# Patient Record
Sex: MALE | Race: WHITE | Employment: FULL TIME | URBAN - METROPOLITAN AREA
[De-identification: names, ages, dates, MRNs, and addresses within clinical notes are randomized per-mention and may not be internally consistent; named-entity substitution may affect disease eponyms.]

---

## 2019-06-24 ENCOUNTER — HOSPITAL ENCOUNTER (EMERGENCY)
Age: 24
Discharge: HOME | End: 2019-06-24
Attending: EMERGENCY MEDICINE
Payer: COMMERCIAL

## 2019-06-24 VITALS
RESPIRATION RATE: 16 BRPM | BODY MASS INDEX: 23.19 KG/M2 | TEMPERATURE: 98.3 F | DIASTOLIC BLOOD PRESSURE: 70 MMHG | SYSTOLIC BLOOD PRESSURE: 130 MMHG | HEIGHT: 73 IN | OXYGEN SATURATION: 99 % | WEIGHT: 175 LBS | HEART RATE: 72 BPM

## 2019-06-24 DIAGNOSIS — S29.012A STRAIN OF RHOMBOID MUSCLE, INITIAL ENCOUNTER: Primary | ICD-10-CM

## 2019-06-24 DIAGNOSIS — M62.838 SPASM OF MUSCLE: ICD-10-CM

## 2019-06-24 PROCEDURE — 99282 EMERGENCY DEPT VISIT SF MDM: CPT

## 2019-06-24 PROCEDURE — 6360000002 HC RX W HCPCS: Performed by: EMERGENCY MEDICINE

## 2019-06-24 PROCEDURE — 6370000000 HC RX 637 (ALT 250 FOR IP): Performed by: EMERGENCY MEDICINE

## 2019-06-24 PROCEDURE — 96372 THER/PROPH/DIAG INJ SC/IM: CPT

## 2019-06-24 RX ORDER — KETOROLAC TROMETHAMINE 30 MG/ML
30 INJECTION, SOLUTION INTRAMUSCULAR; INTRAVENOUS ONCE
Status: COMPLETED | OUTPATIENT
Start: 2019-06-24 | End: 2019-06-24

## 2019-06-24 RX ORDER — NAPROXEN 500 MG/1
500 TABLET ORAL 2 TIMES DAILY
Qty: 14 TABLET | Refills: 0 | Status: SHIPPED | OUTPATIENT
Start: 2019-06-24 | End: 2019-07-09 | Stop reason: ALTCHOICE

## 2019-06-24 RX ORDER — CYCLOBENZAPRINE HCL 10 MG
10 TABLET ORAL 3 TIMES DAILY PRN
Qty: 12 TABLET | Refills: 0 | Status: SHIPPED | OUTPATIENT
Start: 2019-06-24 | End: 2019-06-28

## 2019-06-24 RX ORDER — CYCLOBENZAPRINE HCL 10 MG
10 TABLET ORAL ONCE
Status: COMPLETED | OUTPATIENT
Start: 2019-06-24 | End: 2019-06-24

## 2019-06-24 RX ADMIN — CYCLOBENZAPRINE HYDROCHLORIDE 10 MG: 10 TABLET, FILM COATED ORAL at 01:14

## 2019-06-24 RX ADMIN — KETOROLAC TROMETHAMINE 30 MG: 30 INJECTION, SOLUTION INTRAMUSCULAR at 01:14

## 2019-06-24 ASSESSMENT — PAIN SCALES - GENERAL
PAINLEVEL_OUTOF10: 2
PAINLEVEL_OUTOF10: 2

## 2019-06-24 ASSESSMENT — PAIN DESCRIPTION - LOCATION: LOCATION: BACK

## 2019-06-24 ASSESSMENT — PAIN DESCRIPTION - ORIENTATION: ORIENTATION: UPPER

## 2019-06-24 NOTE — ED PROVIDER NOTES
HPI:  6/24/19,   Time: 1:05 AM         Rawland Goldmann is a 20 y.o. male presenting to the ED for RT pos tier shoulder pain , beginning several hours ago. The complaint has been persistent, mild in severity, and worsened by movement of right arm and shoulder, also has pain with movement of the left shoulder. Patient states that he was working at [employer] when he was taking wood off a pallet and turning and twisting to his machine. States he caught his foot on a piece of metal and wrenched his back. He does have pain medial to the medial border of the right scapula. Worse with range of motion of the shoulder especially adduction and internal rotation of the right shoulder. Patient also complains of pain with movement of the left shoulder. He has no neurological deficits. He has no midline cervical or thoracic tenderness. He denies chest pain cough or shortness of breath. No hemoptysis. .      ROS:   Pertinent positives and negatives are stated within HPI, all other systems reviewed and are negative.  --------------------------------------------- PAST HISTORY ---------------------------------------------  Past Medical History:  has a past medical history of Seasonal allergies. Past Surgical History:  has a past surgical history that includes Tonsillectomy and adenoidectomy. Social History:  reports that he has been smoking. He started smoking about 6 years ago. He has a 3.00 pack-year smoking history. He has never used smokeless tobacco. He reports that he drinks alcohol. He reports that he does not use drugs. Family History: family history is not on file. The patients home medications have been reviewed. Allergies: Patient has no known allergies.     -------------------------------------------------- RESULTS -------------------------------------------------  All laboratory and radiology results have been personally reviewed by myself   LABS:  No results found for this visit on 06/24/19. RADIOLOGY:  Interpreted by Radiologist.  No orders to display       ------------------------- NURSING NOTES AND VITALS REVIEWED ---------------------------   The nursing notes within the ED encounter and vital signs as below have been reviewed. /70   Pulse 72   Temp 98.3 °F (36.8 °C) (Oral)   Resp 16   Ht 6' 1\" (1.854 m)   Wt 175 lb (79.4 kg)   SpO2 99%   BMI 23.09 kg/m²   Oxygen Saturation Interpretation: Normal      ---------------------------------------------------PHYSICAL EXAM--------------------------------------      Constitutional/General: Alert and oriented x3, well appearing, non toxic in NAD  Head: NC/AT  Eyes: PERRL, EOMI  Mouth: Oropharynx clear, handling secretions, no trismus  Neck: Supple, full ROM, no meningeal signs  Pulmonary: Lungs clear to auscultation bilaterally, no wheezes, rales, or rhonchi. Not in respiratory distress, Tenderness to the medical aspect of the RT scapula. NO CTL Midline tenderness or step offs. Cardiovascular:  Regular rate and rhythm, no murmurs, gallops, or rubs. 2+ distal pulses  Abdomen: Soft, non tender, non distended,   Extremities: Moves all extremities x 4. Warm and well perfused  Skin: warm and dry without rash  Neurologic: GCS 15,  Psych: Normal Affect      ------------------------------ ED COURSE/MEDICAL DECISION MAKING----------------------  Medications   ketorolac (TORADOL) injection 30 mg (30 mg Intramuscular Given 6/24/19 0114)   cyclobenzaprine (FLEXERIL) tablet 10 mg (10 mg Oral Given 6/24/19 0114)         Medical Decision Making:    No imaging is required. Patient does have tenderness palpation with muscle spasm noted to the rhomboids bilaterally. More so right than left. He was given Toradol and Flexeril. He'll be discharged home with work restrictions. He is to follow-up with his PCP or Occupational medicine. Re-examination:  6/24/19     Time: 0134  Patients symptoms are improving.   Repeat physical examination has slightly improved. Full ROM RT arm . Counseling: The emergency provider has spoken with the patient and discussed todays results, in addition to providing specific details for the plan of care and counseling regarding the diagnosis and prognosis. Questions are answered at this time and they are agreeable with the plan.      --------------------------------- IMPRESSION AND DISPOSITION ---------------------------------    IMPRESSION  1. Strain of rhomboid muscle, initial encounter    2.  Spasm of muscle        DISPOSITION  Disposition: Discharge to home  Patient condition is stable                Joanne Tirado DO  06/24/19 0141

## 2019-06-24 NOTE — LETTER
[address] [facility] Emergency Department  [facility] [address]  Phone: [phone]               June 24, 2019    Patient: Harriet Ansari   YOB: 1995   Date of Visit: 6/24/2019       To Whom It May Concern:    Harriet Ansari was seen and treated in our emergency department on 6/24/2019. He may return to work on 6/25/2019.       Sincerely,       Uday Flynn DO         Signature:__________________________________

## 2019-06-24 NOTE — ED NOTES
Work note given   Discharged at this time. No change in pain-  Ok with current plan.    rx x 2 given     Sammie Timmons RN  06/24/19 7276

## 2019-07-09 ENCOUNTER — HOSPITAL ENCOUNTER (EMERGENCY)
Age: 24
Discharge: HOME | End: 2019-07-09
Attending: EMERGENCY MEDICINE
Payer: COMMERCIAL

## 2019-07-09 VITALS
SYSTOLIC BLOOD PRESSURE: 120 MMHG | OXYGEN SATURATION: 97 % | RESPIRATION RATE: 16 BRPM | TEMPERATURE: 97.6 F | HEIGHT: 73 IN | HEART RATE: 95 BPM | BODY MASS INDEX: 23.19 KG/M2 | WEIGHT: 175 LBS | DIASTOLIC BLOOD PRESSURE: 88 MMHG

## 2019-07-09 DIAGNOSIS — S29.019D THORACIC MYOFASCIAL STRAIN, SUBSEQUENT ENCOUNTER: Primary | ICD-10-CM

## 2019-07-09 PROCEDURE — 6360000002 HC RX W HCPCS: Performed by: EMERGENCY MEDICINE

## 2019-07-09 PROCEDURE — 99282 EMERGENCY DEPT VISIT SF MDM: CPT

## 2019-07-09 PROCEDURE — 96372 THER/PROPH/DIAG INJ SC/IM: CPT

## 2019-07-09 RX ORDER — KETOROLAC TROMETHAMINE 30 MG/ML
60 INJECTION, SOLUTION INTRAMUSCULAR; INTRAVENOUS ONCE
Status: COMPLETED | OUTPATIENT
Start: 2019-07-09 | End: 2019-07-09

## 2019-07-09 RX ORDER — CYCLOBENZAPRINE HCL 5 MG
5 TABLET ORAL 3 TIMES DAILY PRN
Qty: 30 TABLET | Refills: 0 | Status: SHIPPED | OUTPATIENT
Start: 2019-07-09 | End: 2019-07-19

## 2019-07-09 RX ADMIN — KETOROLAC TROMETHAMINE 60 MG: 30 INJECTION, SOLUTION INTRAMUSCULAR at 03:06

## 2019-07-09 ASSESSMENT — PAIN DESCRIPTION - LOCATION: LOCATION: BACK

## 2019-07-09 ASSESSMENT — PAIN DESCRIPTION - ORIENTATION: ORIENTATION: UPPER

## 2019-07-09 ASSESSMENT — PAIN DESCRIPTION - PAIN TYPE: TYPE: ACUTE PAIN

## 2019-07-09 ASSESSMENT — PAIN SCALES - GENERAL
PAINLEVEL_OUTOF10: 5
PAINLEVEL_OUTOF10: 5

## 2019-07-09 NOTE — ED PROVIDER NOTES
Saturation Interpretation: Normal.    Constitutional:  Alert, development consistent with age. HEENT:  NC/NT. Airway patent. Neck:  Normal ROM. Supple. Respiratory:  Clear to auscultation and breath sounds equal.  CV:  Regular rate and rhythm, normal heart sounds, without pathological murmurs, ectopy, gallops, or rubs. GI:  Abdomen Soft, nontender, good bowel sounds. No firm or pulsatile mass. Back: upper thoracic spine bilateral.             Tenderness: Moderate. Swelling: no.              Range of Motion: full range of motion. CVA Tenderness: No.                     Skin:  no erythema, rash or swelling noted. Distal Function:              Motor deficit: none. Sensory deficit: none. Pulse deficit: none. Edema:  None all extremity(s). Gait:  normal.  Integument:  Normal turgor. Warm, dry, without visible rash. Lymphatics: No lymphangitis or adenopathy noted. Neurological:  Oriented. Motor functions intact. Lab / Imaging Results   (All laboratory and radiology results have been personally reviewed by myself)  Labs:  No results found for this visit on 07/09/19. Imaging: All Radiology results interpreted by Radiologist unless otherwise noted. No orders to display       ED Course / Medical Decision Making     Medications   ketorolac (TORADOL) injection 60 mg (60 mg Intramuscular Given 7/9/19 0306)        Re-examination:  7/9/19       Time: 03:30    Patients symptoms are improving. Medical Decision Making:    Films were not obtained based on negative suspicion for bony injury as per history/physical findings . [facility] At this time the patient is without objective evidence of an acute process requiring inpatient management. Counseling:     The emergency provider has spoken with the patient and discussed todays visit, in addition to providing specific details for the plan of care and counseling regarding the diagnosis

## 2019-07-09 NOTE — ED NOTES
Patient discharged to waiting room, lab to do post injury drug screen.       Heather Shoemaker RN  07/09/19 0734

## 2019-07-27 ENCOUNTER — HOSPITAL ENCOUNTER (OUTPATIENT)
Age: 24
Discharge: HOME | End: 2019-07-29
Payer: COMMERCIAL

## 2019-07-27 ENCOUNTER — HOSPITAL ENCOUNTER (OUTPATIENT)
Dept: GENERAL RADIOLOGY | Age: 24
Discharge: HOME | End: 2019-07-29
Payer: COMMERCIAL

## 2019-07-27 DIAGNOSIS — S46.812A STRAIN OF LEFT TRAPEZIUS MUSCLE, INITIAL ENCOUNTER: ICD-10-CM

## 2019-07-27 DIAGNOSIS — S29.012A UPPER BACK STRAIN, INITIAL ENCOUNTER: ICD-10-CM

## 2019-07-27 PROCEDURE — 72050 X-RAY EXAM NECK SPINE 4/5VWS: CPT

## 2019-07-27 PROCEDURE — 72072 X-RAY EXAM THORAC SPINE 3VWS: CPT

## 2019-07-31 ENCOUNTER — EVALUATION (OUTPATIENT)
Dept: PHYSICAL THERAPY | Age: 24
End: 2019-07-31
Payer: COMMERCIAL

## 2019-07-31 DIAGNOSIS — S23.3XXA SPRAIN OF LIGAMENTS OF THORACIC SPINE, INITIAL ENCOUNTER: Primary | ICD-10-CM

## 2019-07-31 PROCEDURE — 97163 PT EVAL HIGH COMPLEX 45 MIN: CPT | Performed by: PHYSICAL THERAPIST

## 2019-07-31 RX ORDER — CYCLOBENZAPRINE HCL 10 MG
10 TABLET ORAL 3 TIMES DAILY PRN
COMMUNITY

## 2019-07-31 RX ORDER — NAPROXEN 500 MG/1
500 TABLET ORAL 2 TIMES DAILY WITH MEALS
COMMUNITY

## 2019-08-02 ENCOUNTER — TREATMENT (OUTPATIENT)
Dept: PHYSICAL THERAPY | Age: 24
End: 2019-08-02
Payer: COMMERCIAL

## 2019-08-02 DIAGNOSIS — S23.3XXA SPRAIN OF LIGAMENTS OF THORACIC SPINE, INITIAL ENCOUNTER: Primary | ICD-10-CM

## 2019-08-02 PROCEDURE — 97530 THERAPEUTIC ACTIVITIES: CPT

## 2019-08-02 PROCEDURE — 97110 THERAPEUTIC EXERCISES: CPT

## 2019-08-05 ENCOUNTER — TREATMENT (OUTPATIENT)
Dept: PHYSICAL THERAPY | Age: 24
End: 2019-08-05
Payer: COMMERCIAL

## 2019-08-05 DIAGNOSIS — S23.3XXA SPRAIN OF LIGAMENTS OF THORACIC SPINE, INITIAL ENCOUNTER: Primary | ICD-10-CM

## 2019-08-05 PROCEDURE — 97110 THERAPEUTIC EXERCISES: CPT | Performed by: PHYSICAL THERAPIST

## 2019-08-05 PROCEDURE — 97530 THERAPEUTIC ACTIVITIES: CPT | Performed by: PHYSICAL THERAPIST

## 2019-08-07 ENCOUNTER — TREATMENT (OUTPATIENT)
Dept: PHYSICAL THERAPY | Age: 24
End: 2019-08-07
Payer: COMMERCIAL

## 2019-08-07 DIAGNOSIS — S23.3XXA SPRAIN OF LIGAMENTS OF THORACIC SPINE, INITIAL ENCOUNTER: Primary | ICD-10-CM

## 2019-08-07 PROCEDURE — 97530 THERAPEUTIC ACTIVITIES: CPT

## 2019-08-07 PROCEDURE — 97110 THERAPEUTIC EXERCISES: CPT

## 2019-08-09 ENCOUNTER — TREATMENT (OUTPATIENT)
Dept: PHYSICAL THERAPY | Age: 24
End: 2019-08-09
Payer: COMMERCIAL

## 2019-08-09 DIAGNOSIS — S23.3XXA SPRAIN OF LIGAMENTS OF THORACIC SPINE, INITIAL ENCOUNTER: Primary | ICD-10-CM

## 2019-08-09 PROCEDURE — 97110 THERAPEUTIC EXERCISES: CPT | Performed by: PHYSICAL THERAPIST

## 2019-08-09 PROCEDURE — 97530 THERAPEUTIC ACTIVITIES: CPT | Performed by: PHYSICAL THERAPIST

## 2019-08-09 NOTE — PROGRESS NOTES
Physical Therapy Daily Treatment Note    Date: 2019  Patient Name: Carolyn Giraldo  : 1995   MRN: [id number]  DOInjury: initial 19, then reinjured 19    Referring Provider: Dipti Caro [address] [facility] [address]  [place], [place] [address]                             Medical Diagnosis:   S23. 3XXA (ICD-10-CM) - Sprain of ligaments of thoracic spine, initial encounter  Outcome Measure:  NDI 22%    12 visits WC    S:pt reports  Increase soreness today,  . O:  Time 9293-1855  am     Visit  Repeat outcome measure at mid point and end. Pain 2/10     ROM      Modalities      MH X 15 mins supine   pre ex's  MO   Ice            Manual                  Stretch                              Exercise      UBE  Fwds/bkds X 3/3   TE         Nustep        ROWS: H Duque  2 x 20  TA   ROWS: M      55#  3 x 15  seated   TA   ROWS: L Duque  2 x 20  TA   Pushing  Gray 2 x 20  TA   Shoulder ER      Cross country ski [place]han 2 x 20   TA   Shrugs      Shoulder Press Emily 5# 2 x 20  TA               Cervical isometrics deep flexors      Cervical isometrics deep extensors            A:  Tolerated well. Above added to written HEP. P: Continue with rehab plan. Pt scheduled for 1 rx next wk to call back for future rx sessions not sure of scheduled Dr's appt time.       WC 12 visits total .  Kev Young PTA    Treatment Charges: Mins Units   Initial Evaluation     Re-Evaluation     Ther Exercise         TE 10 1   Manual Therapy     MT     Ther Activities        TA 25 2   Gait Training          GT     Neuro Re-education NR     Modalities     Non-Billable Service Time MH  X 15 min  0   Other     Total Time/Units 50 3

## 2019-08-13 ENCOUNTER — TREATMENT (OUTPATIENT)
Dept: PHYSICAL THERAPY | Age: 24
End: 2019-08-13
Payer: COMMERCIAL

## 2019-08-13 DIAGNOSIS — S23.3XXA SPRAIN OF LIGAMENTS OF THORACIC SPINE, INITIAL ENCOUNTER: Primary | ICD-10-CM

## 2019-08-13 PROCEDURE — G0283 ELEC STIM OTHER THAN WOUND: HCPCS

## 2019-08-13 PROCEDURE — 97110 THERAPEUTIC EXERCISES: CPT

## 2019-08-13 PROCEDURE — 97530 THERAPEUTIC ACTIVITIES: CPT

## 2019-08-13 NOTE — PROGRESS NOTES
Physical Therapy Daily Treatment Note    Date: 2019  Patient Name: Heidi Rojas  : 1995   MRN: [id number]  DOInjury: initial 19, then reinjured 19    Referring Provider: Dipti Thurston [address] [facility] [address]  [place], [place] [address]                             Medical Diagnosis:   S23. 3XXA (ICD-10-CM) - Sprain of ligaments of thoracic spine, initial encounter  Outcome Measure:  NDI 22%                 Midpoint NDI score= 22% ( same) . 12 visits WC    S:pt reports no new significant changes, along with having trouble sleeping last night  . O:  Time 0795-2338   am     Visit  Repeat outcome measure at mid point and end. Pain 3-4 /10     ROM      Modalities      MH   + ES X 15 mins supine   pre ex's  MO   Ice            Manual                  Stretch                              Exercise      UBE  Fwds/bkds X 3/3   TE         Nustep        ROWS: H Duque  2 x 20  TA   ROWS: M      55#  3 x 15  seated   TA   ROWS: L Duque  2 x 20  TA   Pushing  Gray 2 x 20  TA   Shoulder ER       increase in sorenessShrugs      Shoulder Press Emily 5# 2 x 20  TA         Shoulder shrugs  5# 2 x 20   TE               Cervical isometrics deep flexors      Cervical isometrics deep extensors            A:  Tolerated well. Above added to written HEP. P: Continue with rehab plan.        WC 12 visits total .  Fleta Curl, PTA    Treatment Charges: Mins Units   Initial Evaluation     Re-Evaluation     Ther Exercise         TE 10 1   Manual Therapy     MT     Ther Activities        TA 25 2   Gait Training          GT     Neuro Re-education NR     Modalities ES x 15  1   Non-Billable Service Time MH  X 15 min  0   Other     Total Time/Units 50 4

## 2019-08-15 ENCOUNTER — TREATMENT (OUTPATIENT)
Dept: PHYSICAL THERAPY | Age: 24
End: 2019-08-15
Payer: COMMERCIAL

## 2019-08-15 DIAGNOSIS — S23.3XXA SPRAIN OF LIGAMENTS OF THORACIC SPINE, INITIAL ENCOUNTER: Primary | ICD-10-CM

## 2019-08-15 PROCEDURE — 97110 THERAPEUTIC EXERCISES: CPT

## 2019-08-15 PROCEDURE — G0283 ELEC STIM OTHER THAN WOUND: HCPCS

## 2019-08-19 ENCOUNTER — TREATMENT (OUTPATIENT)
Dept: PHYSICAL THERAPY | Age: 24
End: 2019-08-19
Payer: COMMERCIAL

## 2019-08-19 DIAGNOSIS — S23.3XXA SPRAIN OF LIGAMENTS OF THORACIC SPINE, INITIAL ENCOUNTER: Primary | ICD-10-CM

## 2019-08-19 PROCEDURE — 97110 THERAPEUTIC EXERCISES: CPT

## 2019-08-19 NOTE — PROGRESS NOTES
Physical Therapy Daily Treatment Note    Date: 2019  Patient Name: Evy Pedro  : 1995   MRN: [id number]  DOInjury: initial 19, then reinjured 19    Referring Provider: Dipti Jones [address] [facility] [address]  [place] [place]                             Medical Diagnosis:   S23. 3XXA (ICD-10-CM) - Sprain of ligaments of thoracic spine, initial encounter  Outcome Measure:  NDI 22%                 Midpoint NDI score= 22% ( same) . 12 visits WC    S:pt reports feeling good today. O:  Time 1692-4395  am     Visit  Repeat outcome measure at mid point and end. Pain 3-4 /10     ROM      Modalities      MH   X 15 mins supine   pre ex's  MO   Ice            Manual                  Stretch                              Exercise      UBE  Fwds/bkds X 3/3   TE         Nustep        ROWS: H Duque  2 x 20  TA   ROWS: Carrie Nakai      55#  3 x 15  seated   TA   ROWS: L Duque  2 x 20  TA   Pushing  Gray 2 x 20  TA   Lat pull downs  55# 3 x 15            Shoulder ER      Cross country ski Ardeth Jessica 2 x 20   increase in soreness Shrugs      Shoulder Press Emily 5# 2 x 20  TA         Shoulder shrugs  5# 2 x 20   TE               Cervical isometrics deep flexors      Cervical isometrics deep extensors            A:  Tolerated well. ( Pt had a RTD  now on new meds ).         WC 12 visits total .  Jayashree Madie, PTA    Treatment Charges: Mins Units   Initial Evaluation     Re-Evaluation     Ther Exercise         TE 30 2   Manual Therapy     MT     Ther Activities        TA     Gait Training          GT     Neuro Re-education NR     Modalities     Non-Billable Service Time MH  X 15 min  0   Other     Total Time/Units 45 2

## 2019-08-21 ENCOUNTER — TREATMENT (OUTPATIENT)
Dept: PHYSICAL THERAPY | Age: 24
End: 2019-08-21
Payer: COMMERCIAL

## 2019-08-21 DIAGNOSIS — S23.3XXA SPRAIN OF LIGAMENTS OF THORACIC SPINE, INITIAL ENCOUNTER: Primary | ICD-10-CM

## 2019-08-21 PROCEDURE — 97110 THERAPEUTIC EXERCISES: CPT

## 2019-08-23 ENCOUNTER — TREATMENT (OUTPATIENT)
Dept: PHYSICAL THERAPY | Age: 24
End: 2019-08-23

## 2019-08-23 DIAGNOSIS — S23.3XXA SPRAIN OF LIGAMENTS OF THORACIC SPINE, INITIAL ENCOUNTER: Primary | ICD-10-CM

## 2019-08-23 NOTE — PROGRESS NOTES
Physical Therapy Daily Treatment Note    Date: 2019  Patient Name: Dameon Baugh  : 1995   MRN: [id number]  DOInjury: initial 19, then reinjured 19    Referring Provider: Dipti Olvera [address] [facility] [address]  [facility] [place] [place] [address]                             Medical Diagnosis:   S23. 3XXA (ICD-10-CM) - Sprain of ligaments of thoracic spine, initial encounter  Outcome Measure:  NDI 22%                 Midpoint NDI score= 22% ( same) . 12 visits WC    S:pt reports needing to leave for a family emergency     O:  Time      Visit  Repeat outcome measure at mid point and end. Pain 3-4 /10     ROM      Modalities        MO   Ice  Pt declined today           Manual                  Stretch                              Exercise      TE         TA   TA   TA   TA               TA      TE               Cervical isometrics deep flexors      Cervical isometrics deep extensors            A:  .      After arriving pt needing to leave for a family emergency  .    WC 12 visits total .  Ruchi Bonilla PTA    Treatment Charges: Mins Units   Initial Evaluation     Re-Evaluation     Ther Exercise         TE     Manual Therapy     MT     Ther Activities        TA     Gait Training          GT     Neuro Re-education NR     Modalities     Non-Billable Service Time     Other     Total Time/Units No rx  0

## 2019-08-29 ENCOUNTER — TREATMENT (OUTPATIENT)
Dept: PHYSICAL THERAPY | Age: 24
End: 2019-08-29
Payer: COMMERCIAL

## 2019-08-29 DIAGNOSIS — S23.3XXA SPRAIN OF LIGAMENTS OF THORACIC SPINE, INITIAL ENCOUNTER: Primary | ICD-10-CM

## 2019-08-29 PROCEDURE — 97110 THERAPEUTIC EXERCISES: CPT

## 2019-08-29 NOTE — PROGRESS NOTES
Physical Therapy Daily Treatment Note    Date: 2019  Patient Name: Cynthia Salinas  : 1995   MRN: [id number]  DOInjury: initial 19, then reinjured 19    Referring Provider: Dipti Merritt [address] [facility] [address]  [place], [place] [address]                             Medical Diagnosis:   S23. 3XXA (ICD-10-CM) - Sprain of ligaments of thoracic spine, initial encounter  Outcome Measure:  NDI 22%                 Midpoint NDI score= 22% ( same) . 12 visits WC    S:pt reports feeling good today. O:  Time 4391-9502 am     Visit 10   /12 Repeat outcome measure at mid point and end. Pain 3-4 /10 Pt arrived early     ROM      Modalities        MO   Ice  Pt declined today           Manual                  Stretch                              Exercise      UBE  Fwds/bkds X 3/3   TE         Nustep        ROWS: H   15#  2 x 20  TA   ROWS: M       55#  3 x 15  seated   TA   ROWS: L    15# 2 x 20  TA   Pushing     15# 2 x 20  TA   Lat pull downs  55# 3 x 15            Shoulder ER      Cross country ski [place] 2 x 20   Shrugs      Shoulder Press Emily 5# 2 x 20  TA         Shoulder shrugs  5# 2 x 20   TE               Cervical isometrics deep flexors      Cervical isometrics deep extensors            A:  Tolerated well. Progression to use of wts as listed    P Continue with rehab x 2 visits   .                 WC 12 visits total .  Irl Pyo, PTA    Treatment Charges: Mins Units   Initial Evaluation     Re-Evaluation     Ther Exercise         TE 30 2   Manual Therapy     MT     Ther Activities        TA     Gait Training          GT     Neuro Re-education NR     Modalities     Non-Billable Service Time     Other     Total Time/Units 30 2

## 2019-09-04 ENCOUNTER — TELEPHONE (OUTPATIENT)
Dept: PHYSICAL THERAPY | Age: 24
End: 2019-09-04

## 2020-01-08 NOTE — PROGRESS NOTES
[facility] OUTPATIENT REHABILITATION  PHYSICAL THERAPY INITIAL EVALUATION         Date:  2019   Patient: Devon Clarke  : 1995  MRN: [id number]  Referring Provider: Aime Calvin  [facility]     Medical Diagnosis:   S23. 3XXA (ICD-10-CM) - Sprain of ligaments of thoracic spine, initial encounter      SUBJECTIVE:     Onset date: 19 initial, reinjured 19 much worse 2nd time per patient    Onset: Sudden onset    Mechanism of Injury: initially pulling on lumber then pulling on a cart same day, pushing down felt a pop near L shoulder blade, couldn't move neck or shoulders in abd    Previous PT: none    States he is 75-80% better than when he was at his worst    Medical Management for Current Problem: medications    Chief complaint: pain and stiffness    Behavior: condition is getting better    Pain: intermittent  Current: 2/10     Best: 0/10     Worst:6/10    Symptom Type/Quality: dull, squeezing  Location[de-identified] Back: between shoulder blades        Provoking Activities/Positions: turning head and body to R when driving                 Relieving Activitie/Positions: sitting up straight    Disturbed Sleep: no  Bladder Dysfunction: no  Bowel Dysfunction: no     Imaging results: Xr Cervical Spine (4-5 Views)    Result Date: 2019  LOCATION: 100 EXAM: XR CERVICAL SPINE (4-5 VIEWS) COMPARISON: None HISTORY: Neck pain possible trauma TECHNIQUE: 7 of the cervical spine were obtained. FINDINGS: Vertebral body heights and spinal alignment is maintained. The spinal pedicles as well as the transverse and spinous processes are well visualized. There is no evidence of fracture or malalignment. Soft tissues appear normal.     No radiographic abnormalities. Xr Thoracic Spine (3 Views)    Result Date: 2019  LOCATION: 100 EXAM: XR THORACIC SPINE (3 VIEWS) COMPARISON: None HISTORY: Pain trauma TECHNIQUE: 3 of the thoracic spine were obtained. Hide Accession Number?: No FINDINGS: Vertebral body heights and spinal alignment is maintained. The spinal pedicles as well as the transverse and spinous processes are well visualized. There is no evidence of fracture or malalignment. Soft tissues appear normal.     No radiographic abnormalities. Past Medical History:  Past Medical History:   Diagnosis Date    Seasonal allergies      Past Surgical History:   Procedure Laterality Date    TONSILLECTOMY AND ADENOIDECTOMY         Medications:   Current Outpatient Medications   Medication Sig Dispense Refill    naproxen (NAPROSYN) 500 MG tablet Take 500 mg by mouth 2 times daily (with meals)      cyclobenzaprine (FLEXERIL) 10 MG tablet Take 10 mg by mouth 3 times daily as needed for Muscle spasms       No current facility-administered medications for this visit. Occupation: steel roll form plant. Physical demands include: lifting, carrying, pushing, pulling heavy weighted items.   Status: full time, currently on light duty    Exercise regimen: play basketball, usually goes to gym 4/days week, golf    Patient Goals: get back to normal    Contraindications/Precautions: none    OBJECTIVE:     Observations: well nourished male, normal affect    Inspection: normal orthopedic exam         Gait: normal    Range of Motion:    Neck:    Flexion:  [x] Normal   [] Limited pulls uncomfortably in mid scap region   Extension:  [x] Normal   [] Limited     Right Rotation: [x] Normal   [] Limited pulls on contralateral side   Left Rotation:  [x] Normal   [] Limited pulls on contralateral side   Right Side Bending: [x] Normal   [] Limited pulls on contralateral side   Left Side Bending: [x] Normal   [] Limited     Upper Extremity:   Right:   [x] Normal   [] Limited    Left:   [x] Normal   [] Limited       Strength:   Neck: 4/5 flexion and extension   R UE: 5/5   L UE: 5/5    Palpation: Tender to palpation just medial to each scapula bilaterally     Sensation: intact to light touch and Was A Bandage Applied: Yes Silver Nitrate Text: The wound bed was treated with silver nitrate after the biopsy was performed. temperature. Special Tests:   [x] Spurlings           Right []+ / [x] -    Left []+ / [x] -  [] Slump           Right []+ / [] -    Left []+ / [] -  [] FADIR          Right []+ / [] -    Left []+ / [] -  [] S-I Distraction          Right []+ / [] -    Left []+ / [] -     [x] Nerve Root Compression           Right []+ / [x] -    Left []+ / [x] -     [x] Distraction:          Right []+ / [x] -    Left []+ / [x] -  [] S-I Compression          Right []+ / [] -    Left []+ / [] -   [] Other: []+ / [] -       Special Test Comments:     ASSESSMENT     Outcome Measure:   NDI 22% disability    Problems:    Pain reported 2-6/10   ROM decreased    Strength decreased   Decreased functional ability with work    [x] There are no barriers affecting plan of care or recovery    [] Barriers to this patient's plan of care or recovery include. Domestic Concerns:  [x] No  [] Yes:    Short Term goals (2-3 weeks)   Decrease reported pain to 1-3/10   Increase ROM to WNL w/o pain   Increase Strength to 4+/5    NDI 12% disability    Long Term goals (4-6 weeks)   Decrease reported pain to 0-1/10   Increase Strength to 5/5    Able to perform/complete the following functions/tasks: return to normal duty work    NDI 4% disability    Independent with Home Exercise Programs    Rehab Potential: [x] Good  [] Fair  [] Poor    PLAN       Treatment Plan:   [x] Therapeutic Exercise  [x] Therapeutic Activity  [x] Neuromuscular Re-education   [] Gait Training  [] Balance Training  [] Aerobic conditioning  [] Manual Therapy  [] Massage/Fascial release   [] Work/Sport specific activities    [] Pain Neuroscience [x] Cold/hotpack  [] Vasocompression  [x] Electrical Stimulation  [] Lumbar/Cervical Traction  [] Ultrasound   [] Iontophoresis: 4 mg/mL Dexamethasone Sodium Phosphate 40-80 mAmin        [x] Instruction in HEP      []  Medication allergies reviewed for use of Dexamethasone Sodium Phosphate 4mg/ml  with iontophoresis treatments. Curettage Text: The wound bed was treated with curettage after the biopsy was performed. Patient is not allergic. The following CPT codes are likely to be used in the care of this patient: 02443 Therapeutic Exercise , 95755 Therapeutic Activities       Suggested Professional Referral: [x] No  [] Yes:     Patient Education:  [x] Plans/Goals, Risks/Benefits discussed  [x] Home exercise program  Method of Education: [x] Verbal  [x] Demo  [x] Written  Comprehension of Education:  [x] Verbalizes understanding. [x] Demonstrates understanding. [] Needs Review. [] Demonstrates/verbalizes understanding of HEP/Ed previously given. Frequency:  2-3 days per week for 4-6 weeks    Patient understands diagnosis/prognosis and consents to treatment, plan and goals: [x] Yes    [] No     Thank you for the opportunity to work with your patient. If you have questions or comments, please contact me at [phone]; fax: [phone]. Electronically signed by: Reginaldo Alfaro PT         Please sign Physician's Certification and return to: [facility]  [address] [place] [id number]  Dept: [phone]  Dept Fax: [phone] Certification / Comments     Frequency/Duration 2-3 days per week for 4-6 weeks. Certification period from 7/31/2019  to 10-25-19. I have reviewed the Plan of Care established for skilled therapy services and certify that the services are required and that they will be provided while the patient is under my care.     Physician's Comments/Revisions:               Physician's Printed Name:                                           [de-identified] Signature:                                                               Date: Hemostasis: Ezequiel's Biopsy Method: Personna blade Lab: 6 Billing Type: Third-Party Bill Detail Level: Detailed Cryotherapy Text: The wound bed was treated with cryotherapy after the biopsy was performed. Lab Facility: 3 Post-Care Instructions: I reviewed with the patient in detail post-care instructions. Patient is to keep the biopsy site dry overnight, and then apply vaseline once daily until healed. Wound Care: Petrolatum Size Of Lesion In Cm: 0 Anesthesia Type: 1% lidocaine with epinephrine Depth Of Biopsy: dermis Electrodesiccation Text: The wound bed was treated with electrodesiccation after the biopsy was performed. Notification Instructions: Patient will be notified of biopsy results. However, patient instructed to call the office if not contacted within 2 weeks. Type Of Destruction Used: Curettage Electrodesiccation And Curettage Text: The wound bed was treated with electrodesiccation and curettage after the biopsy was performed. Biopsy Type: H and E Consent: Written consent was obtained and risks were reviewed including but not limited to scarring, infection, bleeding, scabbing, incomplete removal, nerve damage and allergy to anesthesia. Dressing: bandage Anesthesia Volume In Cc (Will Not Render If 0): 0.5

## 2025-06-11 ENCOUNTER — HOSPITAL ENCOUNTER (EMERGENCY)
Age: 30
Discharge: HOME | End: 2025-06-11
Payer: COMMERCIAL

## 2025-06-11 VITALS
DIASTOLIC BLOOD PRESSURE: 71 MMHG | HEIGHT: 73 IN | TEMPERATURE: 98 F | HEART RATE: 88 BPM | OXYGEN SATURATION: 97 % | RESPIRATION RATE: 16 BRPM | SYSTOLIC BLOOD PRESSURE: 123 MMHG | WEIGHT: 180 LBS | BODY MASS INDEX: 23.86 KG/M2

## 2025-06-11 DIAGNOSIS — S61.217A LACERATION OF LEFT LITTLE FINGER WITHOUT FOREIGN BODY WITHOUT DAMAGE TO NAIL, INITIAL ENCOUNTER: Primary | ICD-10-CM

## 2025-06-11 DIAGNOSIS — R55 VASOVAGAL SYNCOPE: ICD-10-CM

## 2025-06-11 PROCEDURE — 99282 EMERGENCY DEPT VISIT SF MDM: CPT

## 2025-06-11 PROCEDURE — 12001 RPR S/N/AX/GEN/TRNK 2.5CM/<: CPT

## 2025-06-11 ASSESSMENT — LIFESTYLE VARIABLES
HOW MANY STANDARD DRINKS CONTAINING ALCOHOL DO YOU HAVE ON A TYPICAL DAY: PATIENT DOES NOT DRINK
HOW OFTEN DO YOU HAVE A DRINK CONTAINING ALCOHOL: NEVER

## 2025-06-11 ASSESSMENT — PAIN DESCRIPTION - DESCRIPTORS: DESCRIPTORS: ACHING

## 2025-06-11 ASSESSMENT — PAIN DESCRIPTION - FREQUENCY: FREQUENCY: INTERMITTENT

## 2025-06-11 ASSESSMENT — PAIN - FUNCTIONAL ASSESSMENT
PAIN_FUNCTIONAL_ASSESSMENT: 0-10
PAIN_FUNCTIONAL_ASSESSMENT: PREVENTS OR INTERFERES SOME ACTIVE ACTIVITIES AND ADLS

## 2025-06-11 ASSESSMENT — PAIN DESCRIPTION - PAIN TYPE: TYPE: ACUTE PAIN

## 2025-06-11 ASSESSMENT — PAIN DESCRIPTION - ORIENTATION: ORIENTATION: LEFT;DISTAL;ANTERIOR

## 2025-06-11 ASSESSMENT — PAIN SCALES - GENERAL: PAINLEVEL_OUTOF10: 5

## 2025-06-11 ASSESSMENT — PAIN DESCRIPTION - LOCATION: LOCATION: FINGER (COMMENT WHICH ONE)

## 2025-06-11 NOTE — ED PROVIDER NOTES
Independent JIMBO Visit.     [facility]  Department of Emergency Medicine   ED  Encounter Note  Admit Date/RoomTime: 2025  5:25 PM  ED Room: [place] [address]  NAME: Sharath Moy  : 1995  MRN: [id number]     Chief Complaint:  Laceration (Laceration to left distal anterior fifth digit laceration, approx 1 cm, not bleeding at this time - from circular saw, just prior to arrival)    HISTORY OF PRESENT ILLNESS        Sharath Moy is a 29 y.o. male with a PMH significant for none presents to the ED with a laceration to his left pinky finger.    Patient states he cut it with a circular saw.  He is right-hand dominant.  Last tetanus within a couple years ago, definitely less than 5.  Symptoms are mild in severity.      ROS   Pertinent positives and negatives are stated within HPI, all other systems reviewed and are negative.    Past Medical History:  has a past medical history of Seasonal allergies.    Surgical History:  has a past surgical history that includes Tonsillectomy and adenoidectomy.    Social History:  reports that he has been smoking. He started smoking about 11 years ago. He has a 6 pack-year smoking history. He has never used smokeless tobacco. He reports current alcohol use. He reports that he does not use drugs.    Family History: family history is not on file.     Allergies: Patient has no known allergies.    PHYSICAL EXAM   Oxygen Saturation Interpretation: Normal on room air analysis.        ED Triage Vitals [25 1652]   BP Systolic BP Percentile Diastolic BP Percentile Temp Temp src Pulse Resp SpO2   -- -- -- -- -- (!) 112 -- 98 %      Height Weight         -- --               General:  NAD.  Alert and Oriented.  Well-appearing.  Skin:  Warm, dry.  No rashes.  Head:  Normocephalic.  Atraumatic.  Eyes:  EOMI.  Conjunctiva normal.  ENT:  Oral mucosa moist.  Airway patent.  Neck:  Supple.  Normal ROM.    Respiratory:  No respiratory distress.  No labored breathing.  Lungs

## 2025-06-11 NOTE — DISCHARGE INSTRUCTIONS
Wound Care Instructions:  Keep the wound clean, dry and covered for the next 48 hours.  Put on a clean dressing every 12 hours.    Cover with a light dressing at night so the wound does not rub against the bed.  Wound may be left uncovered after 48 hours.  You may apply a thin application of bacitracin or any antibiotic ointment on the wound AFTER 48 hours of dry dressings.  After 48 hours keep the wound covered if there is a chance it could get wet or dirty.  Remember- a wet wound will not heal.  Showering is fine, but do not soak the wounded area in a tub or dish-water until the wound is healed.    Sutures out in 10 days

## 2025-06-25 ENCOUNTER — HOSPITAL ENCOUNTER (EMERGENCY)
Age: 30
Discharge: HOME | End: 2025-06-25
Payer: COMMERCIAL

## 2025-06-25 VITALS
SYSTOLIC BLOOD PRESSURE: 139 MMHG | DIASTOLIC BLOOD PRESSURE: 85 MMHG | TEMPERATURE: 98 F | HEART RATE: 64 BPM | RESPIRATION RATE: 18 BRPM | OXYGEN SATURATION: 98 %

## 2025-06-25 DIAGNOSIS — K08.89 PAIN, DENTAL: Primary | ICD-10-CM

## 2025-06-25 PROCEDURE — 6370000000 HC RX 637 (ALT 250 FOR IP): Performed by: PHYSICIAN ASSISTANT

## 2025-06-25 PROCEDURE — 99283 EMERGENCY DEPT VISIT LOW MDM: CPT

## 2025-06-25 RX ORDER — HYDROCODONE BITARTRATE AND ACETAMINOPHEN 5; 325 MG/1; MG/1
1 TABLET ORAL ONCE
Status: COMPLETED | OUTPATIENT
Start: 2025-06-25 | End: 2025-06-25

## 2025-06-25 RX ADMIN — AMOXICILLIN AND CLAVULANATE POTASSIUM 1 TABLET: 875; 125 TABLET, FILM COATED ORAL at 22:13

## 2025-06-25 RX ADMIN — HYDROCODONE BITARTRATE AND ACETAMINOPHEN 1 TABLET: 5; 325 TABLET ORAL at 22:13

## 2025-06-25 ASSESSMENT — PAIN SCALES - GENERAL: PAINLEVEL_OUTOF10: 7

## 2025-06-26 NOTE — ED PROVIDER NOTES
Independent JIMBO Visit.   HPI:  6/25/25,   Time: 9:22 PM EDT         Sharath Moy is a 29 y.o. male presenting to the ED for dental pain right facial swelling, beginning chronic worse over the last 36 hours   The complaint has been persistent, moderate in severity, and worsened by chewing  Patient comes in with complaint of right upper abdominal pain months been chronic in nature worse over the last 36 hours.  He has some swelling of the right face denies any difficulty swallowing no shortness of breath.  He has been taking Tylenol Motrin with no improvement.  Denies any fever or chills.  ROS:   Pertinent positives and negatives are stated within HPI, all other systems reviewed and are negative.  --------------------------------------------- PAST HISTORY ---------------------------------------------  Past Medical History:  has a past medical history of Seasonal allergies.    Past Surgical History:  has a past surgical history that includes Tonsillectomy and adenoidectomy.    Social History:  reports that he has been smoking. He started smoking about 12 years ago. He has a 6 pack-year smoking history. He has never used smokeless tobacco. He reports current alcohol use. He reports that he does not use drugs.    Family History: family history is not on file.     The patient’s home medications have been reviewed.    Allergies: Patient has no known allergies.    -------------------------------------------------- RESULTS -------------------------------------------------  All laboratory and radiology results have been personally reviewed by myself   LABS:  No results found for this visit on 06/25/25.    RADIOLOGY:  Interpreted by Radiologist.  No orders to display       ------------------------- NURSING NOTES AND VITALS REVIEWED ---------------------------   The nursing notes within the ED encounter and vital signs as below have been reviewed.   /85   Pulse 64   Temp 98 °F (36.7 °C)   Resp 18   SpO2 98%